# Patient Record
Sex: FEMALE | Race: AMERICAN INDIAN OR ALASKA NATIVE
[De-identification: names, ages, dates, MRNs, and addresses within clinical notes are randomized per-mention and may not be internally consistent; named-entity substitution may affect disease eponyms.]

---

## 2020-08-22 ENCOUNTER — HOSPITAL ENCOUNTER (EMERGENCY)
Dept: HOSPITAL 5 - ED | Age: 30
Discharge: HOME | End: 2020-08-22
Payer: SELF-PAY

## 2020-08-22 VITALS — DIASTOLIC BLOOD PRESSURE: 81 MMHG | SYSTOLIC BLOOD PRESSURE: 113 MMHG

## 2020-08-22 DIAGNOSIS — M79.18: ICD-10-CM

## 2020-08-22 DIAGNOSIS — J45.909: ICD-10-CM
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
## 2020-08-22 DIAGNOSIS — S16.1XXA: Primary | ICD-10-CM

## 2020-08-22 PROCEDURE — 99281 EMR DPT VST MAYX REQ PHY/QHP: CPT
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
